# Patient Record
Sex: FEMALE | Race: WHITE | NOT HISPANIC OR LATINO | Employment: FULL TIME | URBAN - METROPOLITAN AREA
[De-identification: names, ages, dates, MRNs, and addresses within clinical notes are randomized per-mention and may not be internally consistent; named-entity substitution may affect disease eponyms.]

---

## 2017-02-14 ENCOUNTER — HISTORICAL (OUTPATIENT)
Dept: LAB | Facility: HOSPITAL | Age: 27
End: 2017-02-14

## 2017-02-16 ENCOUNTER — HISTORICAL (OUTPATIENT)
Dept: LAB | Facility: HOSPITAL | Age: 27
End: 2017-02-16

## 2017-03-01 ENCOUNTER — HISTORICAL (OUTPATIENT)
Dept: RADIOLOGY | Facility: HOSPITAL | Age: 27
End: 2017-03-01

## 2017-04-05 ENCOUNTER — HISTORICAL (OUTPATIENT)
Dept: LAB | Facility: HOSPITAL | Age: 27
End: 2017-04-05

## 2017-05-09 ENCOUNTER — HISTORICAL (OUTPATIENT)
Dept: LAB | Facility: HOSPITAL | Age: 27
End: 2017-05-09

## 2017-05-09 LAB — TSH SERPL-ACNC: 1.7 MIU/ML (ref 0.36–3.74)

## 2017-11-28 LAB — POC BETA-HCG (QUAL): NEGATIVE

## 2019-02-11 LAB — POC BETA-HCG (QUAL): NEGATIVE

## 2019-07-09 LAB
B-HCG FREE SERPL-ACNC: <1 MIU/ML
BUN SERPL-MCNC: 12 MG/DL (ref 7–18)
CALCIUM SERPL-MCNC: 9.1 MG/DL (ref 8.5–10.1)
CHLORIDE SERPL-SCNC: 104 MMOL/L (ref 98–107)
CO2 SERPL-SCNC: 27 MMOL/L (ref 21–32)
CREAT SERPL-MCNC: 0.82 MG/DL (ref 0.55–1.02)
CREAT/UREA NIT SERPL: 14.6
ERYTHROCYTE [DISTWIDTH] IN BLOOD BY AUTOMATED COUNT: 11.9 % (ref 11.5–17)
GLUCOSE SERPL-MCNC: 90 MG/DL (ref 74–106)
GROUP & RH: NORMAL
HCT VFR BLD AUTO: 39.9 % (ref 37–47)
HGB BLD-MCNC: 13.4 GM/DL (ref 12–16)
MCH RBC QN AUTO: 29.1 PG (ref 27–31)
MCHC RBC AUTO-ENTMCNC: 33.6 GM/DL (ref 33–36)
MCV RBC AUTO: 86.6 FL (ref 80–94)
PLATELET # BLD AUTO: 219 X10(3)/MCL (ref 130–400)
PMV BLD AUTO: 9.3 FL (ref 9.4–12.4)
POTASSIUM SERPL-SCNC: 4.3 MMOL/L (ref 3.5–5.1)
RBC # BLD AUTO: 4.61 X10(6)/MCL (ref 4.2–5.4)
SODIUM SERPL-SCNC: 137 MMOL/L (ref 136–145)
WBC # SPEC AUTO: 6.2 X10(3)/MCL (ref 4.5–11.5)

## 2019-07-11 ENCOUNTER — HISTORICAL (OUTPATIENT)
Dept: ADMINISTRATIVE | Facility: HOSPITAL | Age: 29
End: 2019-07-11

## 2019-07-11 LAB — B-HCG FREE SERPL-ACNC: <1 MIU/ML

## 2019-09-03 ENCOUNTER — HISTORICAL (OUTPATIENT)
Dept: ADMINISTRATIVE | Facility: HOSPITAL | Age: 29
End: 2019-09-03

## 2019-09-03 LAB
B-HCG FREE SERPL-ACNC: 300 MIU/ML
PROGEST SERPL-MCNC: 25.12 NG/ML

## 2019-09-05 ENCOUNTER — HISTORICAL (OUTPATIENT)
Dept: ADMINISTRATIVE | Facility: HOSPITAL | Age: 29
End: 2019-09-05

## 2019-09-05 LAB
B-HCG FREE SERPL-ACNC: 882 MIU/ML
PROGEST SERPL-MCNC: 17.93 NG/ML

## 2019-09-09 ENCOUNTER — HISTORICAL (OUTPATIENT)
Dept: LAB | Facility: HOSPITAL | Age: 29
End: 2019-09-09

## 2019-09-09 LAB — B-HCG FREE SERPL-ACNC: 4861 MIU/ML

## 2019-10-08 ENCOUNTER — HISTORICAL (OUTPATIENT)
Dept: ADMINISTRATIVE | Facility: HOSPITAL | Age: 29
End: 2019-10-08

## 2019-10-08 LAB
ABS NEUT (OLG): 8.05 X10(3)/MCL (ref 2.1–9.2)
BASOPHILS # BLD AUTO: 0 X10(3)/MCL (ref 0–0.2)
BASOPHILS NFR BLD AUTO: 0 %
EOSINOPHIL # BLD AUTO: 0 X10(3)/MCL (ref 0–0.9)
EOSINOPHIL NFR BLD AUTO: 0 %
ERYTHROCYTE [DISTWIDTH] IN BLOOD BY AUTOMATED COUNT: 11.8 % (ref 11.5–17)
GROUP & RH: NORMAL
HBV SURFACE AG SERPL QL IA: NEGATIVE
HCT VFR BLD AUTO: 38.6 % (ref 37–47)
HCV AB SERPL QL IA: NEGATIVE
HGB BLD-MCNC: 12.9 GM/DL (ref 12–16)
HIV 1+2 AB+HIV1 P24 AG SERPL QL IA: NEGATIVE
LYMPHOCYTES # BLD AUTO: 1.1 X10(3)/MCL (ref 0.6–4.6)
LYMPHOCYTES NFR BLD AUTO: 11 %
MCH RBC QN AUTO: 29.1 PG (ref 27–31)
MCHC RBC AUTO-ENTMCNC: 33.4 GM/DL (ref 33–36)
MCV RBC AUTO: 86.9 FL (ref 80–94)
MONOCYTES # BLD AUTO: 0.7 X10(3)/MCL (ref 0.1–1.3)
MONOCYTES NFR BLD AUTO: 7 %
NEUTROPHILS # BLD AUTO: 8.05 X10(3)/MCL (ref 2.1–9.2)
NEUTROPHILS NFR BLD AUTO: 81 %
PLATELET # BLD AUTO: 231 X10(3)/MCL (ref 130–400)
PMV BLD AUTO: 9 FL (ref 9.4–12.4)
PROGEST SERPL-MCNC: 25.68 NG/ML
RBC # BLD AUTO: 4.44 X10(6)/MCL (ref 4.2–5.4)
T PALLIDUM AB SER QL: NORMAL
TSH SERPL-ACNC: 1.48 MIU/L (ref 0.36–3.74)
WBC # SPEC AUTO: 10 X10(3)/MCL (ref 4.5–11.5)

## 2019-10-31 ENCOUNTER — HISTORICAL (OUTPATIENT)
Dept: ADMINISTRATIVE | Facility: HOSPITAL | Age: 29
End: 2019-10-31

## 2019-10-31 LAB — PROGEST SERPL-MCNC: 32.79 NG/ML

## 2019-11-13 ENCOUNTER — HISTORICAL (OUTPATIENT)
Dept: LAB | Facility: HOSPITAL | Age: 29
End: 2019-11-13

## 2019-11-13 LAB — PROGEST SERPL-MCNC: 31.41 NG/ML

## 2019-12-19 ENCOUNTER — HISTORICAL (OUTPATIENT)
Dept: ADMINISTRATIVE | Facility: HOSPITAL | Age: 29
End: 2019-12-19

## 2019-12-19 LAB — PROGEST SERPL-MCNC: 39.58 NG/ML

## 2019-12-31 ENCOUNTER — HISTORICAL (OUTPATIENT)
Dept: LAB | Facility: HOSPITAL | Age: 29
End: 2019-12-31

## 2019-12-31 LAB — PROGEST SERPL-MCNC: 49.67 NG/ML

## 2020-01-24 ENCOUNTER — HISTORICAL (OUTPATIENT)
Dept: LAB | Facility: HOSPITAL | Age: 30
End: 2020-01-24

## 2020-01-24 LAB
ERYTHROCYTE [DISTWIDTH] IN BLOOD BY AUTOMATED COUNT: 12.8 % (ref 11.5–17)
GLUCOSE 1H P 100 G GLC PO SERPL-MCNC: 148 MG/DL (ref 100–180)
HCT VFR BLD AUTO: 35.5 % (ref 37–47)
HGB BLD-MCNC: 11.9 GM/DL (ref 12–16)
MCH RBC QN AUTO: 29.9 PG (ref 27–31)
MCHC RBC AUTO-ENTMCNC: 33.5 GM/DL (ref 33–36)
MCV RBC AUTO: 89.2 FL (ref 80–94)
PLATELET # BLD AUTO: 196 X10(3)/MCL (ref 130–400)
PMV BLD AUTO: 9.5 FL (ref 9.4–12.4)
RBC # BLD AUTO: 3.98 X10(6)/MCL (ref 4.2–5.4)
WBC # SPEC AUTO: 12.4 X10(3)/MCL (ref 4.5–11.5)

## 2020-01-29 ENCOUNTER — HISTORICAL (OUTPATIENT)
Dept: LAB | Facility: HOSPITAL | Age: 30
End: 2020-01-29

## 2020-01-29 LAB
GLUCOSE 1H P 100 G GLC PO SERPL-MCNC: 153 MG/DL (ref 100–180)
GLUCOSE BS SERPL-MCNC: 128 MG/DL (ref 70–115)
GLUCOSE BS SERPL-MCNC: 94 MG/DL (ref 70–115)
GLUCOSE P FAST SERPL-MCNC: 91 MG/DL (ref 70–115)

## 2020-04-06 ENCOUNTER — HISTORICAL (OUTPATIENT)
Dept: ADMINISTRATIVE | Facility: HOSPITAL | Age: 30
End: 2020-04-06

## 2020-04-09 LAB — FINAL CULTURE: NORMAL

## 2021-03-01 ENCOUNTER — HISTORICAL (OUTPATIENT)
Dept: LAB | Facility: HOSPITAL | Age: 31
End: 2021-03-01

## 2021-03-01 LAB — PROGEST SERPL-MCNC: 19.4 NG/ML

## 2021-03-03 ENCOUNTER — HISTORICAL (OUTPATIENT)
Dept: LAB | Facility: HOSPITAL | Age: 31
End: 2021-03-03

## 2021-03-03 LAB
B-HCG FREE SERPL-ACNC: 244.91 MIU/ML
PROGEST SERPL-MCNC: 16.1 NG/ML

## 2021-04-06 ENCOUNTER — HISTORICAL (OUTPATIENT)
Dept: LAB | Facility: HOSPITAL | Age: 31
End: 2021-04-06

## 2021-04-06 LAB
ERYTHROCYTE [DISTWIDTH] IN BLOOD BY AUTOMATED COUNT: 12.1 % (ref 11.5–17)
GROUP & RH: NORMAL
HBV SURFACE AG SERPL QL IA: NONREACTIVE
HCT VFR BLD AUTO: 39.9 % (ref 37–47)
HCV AB SERPL QL IA: NONREACTIVE
HGB BLD-MCNC: 13.4 GM/DL (ref 12–16)
HIV 1+2 AB+HIV1 P24 AG SERPL QL IA: NONREACTIVE
MCH RBC QN AUTO: 29.8 PG (ref 27–31)
MCHC RBC AUTO-ENTMCNC: 33.6 GM/DL (ref 33–36)
MCV RBC AUTO: 88.9 FL (ref 80–94)
PLATELET # BLD AUTO: 227 X10(3)/MCL (ref 130–400)
PMV BLD AUTO: 9.2 FL (ref 9.4–12.4)
PROGEST SERPL-MCNC: 19.8 NG/ML
RBC # BLD AUTO: 4.49 X10(6)/MCL (ref 4.2–5.4)
T PALLIDUM AB SER QL: NONREACTIVE
TSH SERPL-ACNC: 0.62 UIU/ML (ref 0.35–4.94)
WBC # SPEC AUTO: 8 X10(3)/MCL (ref 4.5–11.5)

## 2021-05-25 ENCOUNTER — HISTORICAL (OUTPATIENT)
Dept: LAB | Facility: HOSPITAL | Age: 31
End: 2021-05-25

## 2021-05-25 LAB — PROGEST SERPL-MCNC: 17.1 NG/ML

## 2021-07-30 ENCOUNTER — HISTORICAL (OUTPATIENT)
Dept: LAB | Facility: HOSPITAL | Age: 31
End: 2021-07-30

## 2021-07-30 LAB
ERYTHROCYTE [DISTWIDTH] IN BLOOD BY AUTOMATED COUNT: 13 % (ref 11.5–17)
GLUCOSE 1H P 100 G GLC PO SERPL-MCNC: 142 MG/DL (ref 100–180)
HCT VFR BLD AUTO: 35.8 % (ref 37–47)
HGB BLD-MCNC: 12.2 GM/DL (ref 12–16)
MCH RBC QN AUTO: 30.7 PG (ref 27–31)
MCHC RBC AUTO-ENTMCNC: 34.1 GM/DL (ref 33–36)
MCV RBC AUTO: 89.9 FL (ref 80–94)
PLATELET # BLD AUTO: 168 X10(3)/MCL (ref 130–400)
PMV BLD AUTO: 9.3 FL (ref 9.4–12.4)
RBC # BLD AUTO: 3.98 X10(6)/MCL (ref 4.2–5.4)
WBC # SPEC AUTO: 9.7 X10(3)/MCL (ref 4.5–11.5)

## 2021-08-03 ENCOUNTER — HISTORICAL (OUTPATIENT)
Dept: LAB | Facility: HOSPITAL | Age: 31
End: 2021-08-03

## 2021-08-03 LAB
GLUCOSE 1H P 100 G GLC PO SERPL-MCNC: 196 MG/DL (ref 100–180)
GLUCOSE BS SERPL-MCNC: 110 MG/DL (ref 70–115)
GLUCOSE BS SERPL-MCNC: 132 MG/DL (ref 70–115)
GLUCOSE P FAST SERPL-MCNC: 96 MG/DL (ref 70–115)

## 2022-04-10 ENCOUNTER — HISTORICAL (OUTPATIENT)
Dept: ADMINISTRATIVE | Facility: HOSPITAL | Age: 32
End: 2022-04-10

## 2022-04-24 VITALS
WEIGHT: 220.69 LBS | SYSTOLIC BLOOD PRESSURE: 145 MMHG | BODY MASS INDEX: 35.47 KG/M2 | DIASTOLIC BLOOD PRESSURE: 91 MMHG | HEIGHT: 66 IN

## 2022-04-30 NOTE — OP NOTE
Patient:   Danisha Rodriguez            MRN: [id number]            FIN: [id number]               Age:   28 years     Sex:  Female     :  1990   Associated Diagnoses:   None   Author:   James Olvera MD      Operative Note   Preoperative diagnosis:    1. pelvic pain  2. suspected endometriosis  3. abnormal uterine bleeding      Postoperative diagnosis:      1.  stage I endometriosis  2. pelvic adhesive disease (altered surgical field)  3. abnormal uterine bleeding    Surgeon:   Dr. Olvera    Operations:    1. robot-assisted laparoscopic lysis of adhesions  2. robot assisted excision of endometriosis  3. diagnostic hysteroscopy with dilation and curettage    Anesthesia: General endotracheal anesthesia    Findings:  Diagnostic laparoscopy significant adhesions involving the sigmoid colon and rectum to left sidewall .  Additionally there were  endometriosis lesions involving the both ovaries, left ovarian fossae, the left Fallopian tube, and  the posterior cul-de-sac. While there was a para-tubal cyst and endometriotic mass on the left tube, the  tubes were otherwise normal in appearance with no evidence of fimbrial adhesions or occlusion        The diagnostic hysteroscope revealed a normal uterine cavity and tubal ostia.  No polyps or submucosal fibroids were seen.    Specimens:  1. posterior cul-de-sac  2. left ovarian fossa  3. left tubal mass  4. endometrial curettings    Procedure:    After informed consent and negative hCG was verified patient was taken to the operating room with IV fluid running. She was then administered general endotracheal anesthesia, and prepped and draped in low lithotomy position using stirrups. The patient's arms were tucked at her sides. A Montiel catheter was placed into the bladder. The 6cm ASHLEY tip was placed on the handle which was then placed into the vaginal cavity. Attention was then turned towards the abdomen where the base of the umbilicus was anesthetized with  half percent Marcaine with epinephrine.   A 8 mm vertical skin incision was made with scalpel at the base of the umbilicus.  A Varess needle and placed in the intraperitoneal cavity. Intraperitoneal placement confirmed by instillation of normal saline and ball test.  The peritoneum was then obtained with CO2 opening pressure that was noted to be 7 mmHg. Pressure was initially set at 20 mm of mercury until all 3 trochars were placed. After placement of all trochars, the pressure was reduced to 12mmHg for the remainder of the case.  A robotic trocar was placed in the right lower quadrant through an anesthetized 8 mm skin incision at a point approximately  8 cm lateral in a 10° caudad direction from the umbilical incision.  An additional accessory trocar was then placed in the left lower quadrant immediately symmetrical to the right lower quadrant port also an anesthetized 8 mm skin incision under laparoscopic visualization.    At this point the patient was placed in steep Trendelenburg. The bowel was swept out of the posterior cul-de-sac to the degree possible with adhesions present. Next Trendelenburg was reduced to approximately 20°. At this time the robot was docked in the following fashion. The 8.5 mm camera was docked at the umbilical port. The right lower quadrant port was assembled with the monopolar scissors using the arm #1. The left lower quadrant port degrees was assembled with the  bipolar forceps using arm #3. At this point the surgeon broke scrub and was seated at the console. Diagnostic laparoscopy was then performed with findings as described above.    First, lysis of adhesions was performed using monopolar scissors.  There was significant work required to free these adhesions, especially those involving the left uterosacral ligmaent and the sigmoid colon.  Next the all of the suspected areas of endometriosis were excised with a combination of both sharp cutting and electocautery with cut current. Care  was taken to observe the course of both ureters prior and during peritoneal resections.     Additionally, a few areas of superficial endometriosis involving the surface of the each ovary were ablated with electrocautery.      At this point the robot was undocked and the surgeon re-scrubbed.    Shayla was applied further ensure hemostasis and then Seprafilm solution was applied through the laparascope. After hemostasis was confirmed the pelvis was irrigated with warm lactated Ringer's solution.   The patient was taken out of Trendelenburg. Pelvis was again inspected and noted to be hemostatic. Approximately 700mL of warm lactated Ringer's was left in the pelvic cavity the close of the case to both minimize postoperative adhesions and pain. Pneumoperitoneum was then evacuated. All trochars were removed from the abdomen. The skin incisions for all 3 trocar sites were then closed with 4-0 Monocryl in subcuticular fashion. The skin incisions were then sealed with Dermabond skin adhesive. The ASHLEY was then removed from the vagina, and the Montiel catheter catheter was left in place of the case.     The operative hysteroscope was introducted into the uterine cavity using Normal Saline as distension media.  Next, a sharp curettage was performed without difficulty.    Instrument sponge and needle counts were correct x2. The patient was extubated and returned to the recovery room awake and in stable condition.    Complications: None    EBL: 40 ml  UOP: 200ml  IV: 1200ml    Fluid Def: 245ml of NS

## 2022-09-22 ENCOUNTER — HISTORICAL (OUTPATIENT)
Dept: ADMINISTRATIVE | Facility: HOSPITAL | Age: 32
End: 2022-09-22

## 2023-07-20 ENCOUNTER — HOSPITAL ENCOUNTER (OUTPATIENT)
Dept: RADIOLOGY | Facility: HOSPITAL | Age: 33
Discharge: HOME | End: 2023-07-20
Attending: NURSE PRACTITIONER
Payer: MEDICAID

## 2023-07-20 DIAGNOSIS — M54.50 LOW BACK PAIN: ICD-10-CM

## 2023-07-20 DIAGNOSIS — M54.50 LOW BACK PAIN: Primary | ICD-10-CM

## 2023-07-20 PROCEDURE — 72100 X-RAY EXAM L-S SPINE 2/3 VWS: CPT | Mod: TC

## 2023-08-02 DIAGNOSIS — M54.9 BACK PAIN: Primary | ICD-10-CM

## 2023-08-15 ENCOUNTER — CLINICAL SUPPORT (OUTPATIENT)
Dept: REHABILITATION | Facility: HOSPITAL | Age: 33
End: 2023-08-15
Payer: MEDICAID

## 2023-08-15 DIAGNOSIS — M53.3 SI (SACROILIAC) JOINT DYSFUNCTION: ICD-10-CM

## 2023-08-15 DIAGNOSIS — M62.9 HAMSTRING TIGHTNESS OF BOTH LOWER EXTREMITIES: ICD-10-CM

## 2023-08-15 DIAGNOSIS — M54.9 BACK PAIN WITHOUT RADIATION: Primary | ICD-10-CM

## 2023-08-15 DIAGNOSIS — M62.81 MUSCLE WEAKNESS OF LOWER EXTREMITY: ICD-10-CM

## 2023-08-15 PROCEDURE — 97163 PT EVAL HIGH COMPLEX 45 MIN: CPT

## 2023-08-15 NOTE — PLAN OF CARE
[facility]   Physical Therapy Initial Evaluation      Name: Danisha Rodriguez  Clinic Number: [id number]    Therapy Diagnosis:   Encounter Diagnoses   Name Primary?    Back pain without radiation Yes    Muscle weakness of lower extremity     SI (sacroiliac) joint dysfunction     Hamstring tightness of both lower extremities         Physician: Obdulia Bah, NP    Physician Orders: PT Eval and Treat   Medical Diagnosis from Referral: M54.9 back pain without radiation  Evaluation Date: 8/15/2023  Authorization Period Expiration: TBD  Plan of Care Expiration: 9/15/2023  Progress Note Due: 9/11/2023  Visit # / Visits authorized: TBD  FOTO: 56    Precautions: Standard     Time In: 0900   Time Out: 1000  Total Billable Time: 60 minutes    Subjective     Date of onset: approximately 7 years ago    History of current condition - Danisha reports: she initially strained her back when she was helping to pull a boat on shore.  She pulled her back and was unable to move.  It improved over time some but she has had pain since.  Since the initial injury she has had 2 children.  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    Falls: none    Imaging: x-ray: grade 1 anterolisthesis of L5 on S1 with associated loss of disc height and a likely pars defect.  No acute fracture identified.    Prior Therapy: none  Social History: patient lives with their family  Occupation: crawfish plant  Prior Level of Function: independent   Current Level of Function: modified independent    Pain:  Current 7/10, worst 9/10, best 6/10   Location: bilateral back  and feet  back - lumbar and bilateral foot/feet   Description: intermittent   Aggravating Factors: Standing and Bending  Easing Factors: rest    Patients goals: relieve pain and be able to bend down easily for my children     Medical History:   Past Medical History:   Diagnosis Date    Abnormal uterine bleeding (AUB)     Anxiety disorder,  unspecified     Deep dyspareunia in female     Dysmenorrhea, unspecified     Endometriosis, unspecified     Gestational hypertension     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    Medications:   Danisha currently has no medications in their medication list.    Allergies:   Review of patient's allergies indicates:  No Known Allergies     Objective      AROM: LUMBAR COMMENT   Flexion 75% Pain on bending   Extension WNL Pain at end range   Right side bending WNL Pulls on left   Left side bending WNL Pulls on right   Right rotation WNL No pain   Left rotation WNL No pain       L/E MMT Right Left   Hip Flexion 5/5 4+/5   Hip Extension 4-/5 3+/5   Hip Abduction 4/5 4/5   Knee Flexion 4/5 4-/5   Knee Extension 5/5 5/5   Ankle DF 5/5 5/5      Posture: L posterior innominate noted which responded to muscle energy exercise to correct    Gait: L foot pronation during stance; minimal to no arm swing.  Gait speed=1.45 meters/second  Balance: 5xSTS = 16.33 sec       Single leg stance: eyes open: R / L 30 seconds each; eyes closed: R 3 seconds, L 7 seconds       MCTSIB= 2/4 (30, 30, 3, 1)      Intake Outcome Measure for FOTO back Survey    Therapist reviewed FOTO scores for Danisha Rodriguez on 8/15/2023.   FOTO report - see Media section or FOTO account episode details.    Intake Score: 56%         Treatment     Total Treatment time (time-based codes) separate from Evaluation: 20 minutes     Danisha received the treatments listed below:      therapeutic exercises to develop strength, flexibility, and core stabilization for 20 minutes including:  Therapeutic Exercise Grid     Exercise 1  Exercise 2  Exercise 3  Exercise 4    Exercise :    Muscle energy exercise bridging   LTR    Sit to stands     Repetition/Time :    3   5   5   5     Resist or Assist :                  Comment :                Done :    yes  yes   yes   yes                    Exercise 5  Exercise 6  Exercise 7  Exercise 8    Exercise :    Bilateral 3 way hip   Bilateral clamshells   Leg lengthener   QL stretch; PF stretch     Repetition/Time :    5 each   5 each   5   Attempted but caused pain     Resist or Assist :                  Comment :                  Done :    yes   yes   yes   no                     Exercise 9  Exercise 10  Exercise 11  Exercise 12    Exercise :    Step ups forward/lateral   Sidestepping with TB Nu-step      Repetition/Time :                  Resist or Assist :                  Comment :                  Done :                                 Exercise 13 Exercise 14  Exercise 15  Exercise 16   Exercise :                  Repetition/Time :                  Resist or Assist :                  Comment :                  Done :                                  Exercise 17 Exercise 18 Exercise 19 Exercise 20    Exercise :                  Repetition/Time :                  Resist or Assist :                  Comment :                  Done :                                   Patient Education and Home Exercises     Education provided:   - Patient was instructed in a home exercise program as well as educated in spinal / pelvic anatomy and rationale for stabilization exercises.  She was also educated about supportive shoes for work.    Written Home Exercises Provided: yes. Exercises were reviewed and Danisha was able to demonstrate them prior to the end of the session.  Danisha demonstrated good  understanding of the education provided. See EMR under Patient Instructions for exercises provided during therapy sessions.    Assessment     Danisha is a 32 y.o. female referred to outpatient Physical Therapy with a medical diagnosis of back pain. Patient presents with SI dysfunction and core weakness which is  contributing to her back pain.  She will benefit from PT to build strength and flexibility to reduce pain.    Patient prognosis is Excellent.   Patient will benefit from skilled outpatient Physical Therapy to address the deficits stated above and in the chart below, provide patient /family education, and to maximize patientt's level of independence.     Plan of care discussed with patient: Yes  Patient's spiritual, cultural and educational needs considered and patient is agreeable to the plan of care and goals as stated below:     Anticipated Barriers for therapy: back pain, SI dysfunction, muscle weakness    Medical Necessity is demonstrated by the following  History  Co-morbidities and personal factors that may impact the plan of care [] LOW: no personal factors / co-morbidities  [] MODERATE: 1-2 personal factors / co-morbidities  [x] HIGH: 3+ personal factors / co-morbidities    Moderate / High Support Documentation:   Co-morbidities affecting plan of care: see above list    Personal Factors:   no deficits     Examination  Body Structures and Functions, activity limitations and participation restrictions that may impact the plan of care [] LOW: addressing 1-2 elements  [] MODERATE: 3+ elements  [x] HIGH: 4+ elements (please support below)    Moderate / High Support Documentation: SI dysfunction, pain, decreased muscle strength, decreased flexibility, impaired balance      Clinical Presentation [x] LOW: stable  [] MODERATE: Evolving  [] HIGH: Unstable     Decision Making/ Complexity Score: high       Goals:  Short Term Goals (3 Weeks):  1. Patient will be compliant with home exercise program to supplement therapy in promoting functional mobility.  2. Patient will report pain </= 4/10 during lumbar active range of motion to promote functional mobility.  3. Patient will improve bilateral hip girdle strength to >/= 4/5 for all muscle groups to improve strength for functional tasks.  4. 5x sit to stand will be 12 sec  or less using UEs to assist as needed.    Long Term Goals (6 Weeks):   1. Patient will improve FOTO score to >/= 66%  to decrease perceived limitation with maintaining/changing body position.   2. Patient will perform home exercise program with good control to demonstrate improved core strength.  3. Patient will improve impaired lower extremity manual muscle tests to >/= 4+/5 to improve strength for functional tasks.  4. Patient's pain complaints with be </= 2/10 during mobility.  5. MCTSIB will improve to 3/4 for safer community mobility.  6. Single leg stance will improve to >/= 15 sec bilaterally with eyes closed for reduced back pain during upright functioning.       Plan     Plan of care Certification: 8/15/2023 to 9/15/2023.    Outpatient Physical Therapy 2 times weekly for 4 weeks to include the following interventions: Manual Therapy, Neuromuscular Re-ed, Patient Education, and Therapeutic Exercise.     Shahida Lauren PT        Physician's Signature: _________________________________________ Date: ________________

## 2023-08-18 DIAGNOSIS — M54.9 BACK PAIN WITHOUT RADIATION: Primary | ICD-10-CM

## 2023-08-24 ENCOUNTER — CLINICAL SUPPORT (OUTPATIENT)
Dept: REHABILITATION | Facility: HOSPITAL | Age: 33
End: 2023-08-24
Payer: MEDICAID

## 2023-08-24 DIAGNOSIS — M53.3 SI (SACROILIAC) JOINT DYSFUNCTION: ICD-10-CM

## 2023-08-24 DIAGNOSIS — M62.81 MUSCLE WEAKNESS OF LOWER EXTREMITY: ICD-10-CM

## 2023-08-24 DIAGNOSIS — M54.9 BACK PAIN WITHOUT RADIATION: Primary | ICD-10-CM

## 2023-08-24 DIAGNOSIS — M62.9 HAMSTRING TIGHTNESS OF BOTH LOWER EXTREMITIES: ICD-10-CM

## 2023-08-24 PROCEDURE — 97110 THERAPEUTIC EXERCISES: CPT

## 2023-08-24 NOTE — PROGRESS NOTES
[facility]   Physical Therapy Treatment Note      Name: Danisha Rodriguez  Clinic Number: [id number]    Therapy Diagnosis:   Encounter Diagnoses   Name Primary?    Back pain without radiation Yes    Muscle weakness of lower extremity     SI (sacroiliac) joint dysfunction     Hamstring tightness of both lower extremities      Physician: Obdulia Bah, NP     Physician Orders: PT Eval and Treat   Medical Diagnosis from Referral: M54.9 back pain without radiation  Evaluation Date: 8/15/2023  Authorization Period Expiration: 9/19/2023  Plan of Care Expiration: 9/19/2023  Progress Note Due: 9/11/2023  Visit # / Visits authorized: 1/10  FOTO: 1/3     Precautions: Standard      Time In: 0830  Time Out: 958  Total Billable Time: 28 minutes  Visit Date: 8/24/2023    PTA Visit #: 0/5     Subjective     Pt reports: she continues to have pain but it's on the right side now.  She was compliant with home exercise program.  Response to previous treatment: soreness  Functional change: 5xSTS time decreased    Pain: 6/10  Location: right buttocks      Objective      R anterior innominate noted today.  Patient responded to MET to resist R hip flexion.  Pelvic levels level after MET and remained level at end of session.    5xSTS = 11.90 sec without using UE to assist    Treatment     Danisha received the treatments listed below:      therapeutic exercises to develop strength, flexibility, and core stabilization for 20 minutes including:  Therapeutic Exercise Grid     Exercise 1  Exercise 2  Exercise 3  Exercise 4    Exercise :    Muscle energy exercise Bridging: ball b/t knees and TB around knees  LTR   Sit to stands     Repetition/Time :    3   10 each    2 x 5     Resist or Assist :        green TB          Comment :    R posterior rotation today      stopped  Cues for improved forward transition     Done :    yes  yes   no  yes                      Exercise 5  Exercise 6  Exercise 7  Exercise 8     Exercise :    Bilateral 3 way hip   Bilateral clamshells; reverse clamshells   Leg lengthener   QL stretch; PF stretch     Repetition/Time :    10 each   10 each   5   Attempted but caused pain     Resist or Assist :                  Comment :    No hip extension              Done :    yes   yes   no   no                      Exercise 9  Exercise 10  Exercise 11  Exercise 12    Exercise :    Step ups forward/lateral   Sidestepping with TB Nu-step SL hip mobility B     Repetition/Time :    10 each bilaterally   4 x 10' each      10 each     Resist or Assist :       GTB           Comment :       Around foot, ankles, knees           Done :    yes   yes      yes                      Exercise 13 Exercise 14  Exercise 15  Exercise 16   Exercise :                  Repetition/Time :                  Resist or Assist :                  Comment :                  Done :                                   Exercise 17 Exercise 18 Exercise 19 Exercise 20    Exercise :                  Repetition/Time :                  Resist or Assist :                  Comment :                  Done :                                 Patient Education and Home Exercises       Education provided:    - Patient was instructed in a home exercise program as well as educated in spinal / pelvic anatomy and rationale for stabilization exercises.  She was also educated about supportive shoes for work.     Written Home Exercises Provided: Patient instructed to cont prior HEP. Exercises were reviewed and Danisha was able to demonstrate them prior to the end of the session.  Danisha demonstrated good  understanding of the education provided. See EMR under Patient Instructions for exercises provided during therapy sessions    Assessment     Insurance approved 10 visits.  Patient voiced soreness after PT program but no increase in pain.  She needed moderate verbal and tactile cues to correct positioning during exercises.    Danisha Is progressing well towards  her goals.   Pt prognosis is Excellent.     Pt will continue to benefit from skilled outpatient physical therapy to address the deficits listed in the problem list box on initial evaluation, provide pt/family education and to maximize pt's level of independence in the home and community environment.     Pt's spiritual, cultural and educational needs considered and pt agreeable to plan of care and goals.     Anticipated barriers to physical therapy:  back pain, SI dysfunction, muscle weakness     Goals: Short Term Goals (3 Weeks):  1. Patient will be compliant with home exercise program to supplement therapy in promoting functional mobility.  2. Patient will report pain </= 4/10 during lumbar active range of motion to promote functional mobility.  3. Patient will improve bilateral hip girdle strength to >/= 4/5 for all muscle groups to improve strength for functional tasks.  4. 5x sit to stand will be 12 sec or less using UEs to assist as needed.     Long Term Goals (6 Weeks):   1. Patient will improve FOTO score to >/= 66%  to decrease perceived limitation with maintaining/changing body position.   2. Patient will perform home exercise program with good control to demonstrate improved core strength.  3. Patient will improve impaired lower extremity manual muscle tests to >/= 4+/5 to improve strength for functional tasks.  4. Patient's pain complaints with be </= 2/10 during mobility.  5. MCTSIB will improve to 3/4 for safer community mobility.  6. Single leg stance will improve to >/= 15 sec bilaterally with eyes closed for reduced back pain during upright functioning.     Plan     Plan of care Certification: 8/15/2023 to 9/19/2023.     Outpatient Physical Therapy 2 times weekly for 4 weeks to include the following interventions: Manual Therapy, Neuromuscular Re-ed, Patient Education, and Therapeutic Exercise.     Shahida Lauren, PT

## 2023-08-29 ENCOUNTER — CLINICAL SUPPORT (OUTPATIENT)
Dept: REHABILITATION | Facility: HOSPITAL | Age: 33
End: 2023-08-29
Payer: MEDICAID

## 2023-08-29 DIAGNOSIS — M62.9 HAMSTRING TIGHTNESS OF BOTH LOWER EXTREMITIES: ICD-10-CM

## 2023-08-29 DIAGNOSIS — M62.81 MUSCLE WEAKNESS OF LOWER EXTREMITY: ICD-10-CM

## 2023-08-29 DIAGNOSIS — M54.9 BACK PAIN WITHOUT RADIATION: Primary | ICD-10-CM

## 2023-08-29 DIAGNOSIS — M53.3 SI (SACROILIAC) JOINT DYSFUNCTION: ICD-10-CM

## 2023-08-29 PROCEDURE — 97110 THERAPEUTIC EXERCISES: CPT

## 2023-08-29 NOTE — PROGRESS NOTES
[facility]   Physical Therapy Treatment Note      Name: Danisha Rodriguez  Clinic Number: [id number]    Therapy Diagnosis:   Encounter Diagnoses   Name Primary?    Back pain without radiation Yes    Muscle weakness of lower extremity     SI (sacroiliac) joint dysfunction     Hamstring tightness of both lower extremities      Physician: Obdulia Bah, NP     Physician Orders: PT Eval and Treat   Medical Diagnosis from Referral: M54.9 back pain without radiation  Evaluation Date: 8/15/2023  Authorization Period Expiration: 9/19/2023  Plan of Care Expiration: 9/19/2023  Progress Note Due: 9/11/2023  Visit # / Visits authorized: 2/10  FOTO: 1/3 (56%)     Precautions: Standard      Time In: 0830  Time Out: 905  Total Billable Time: 35 minutes  Visit Date: 8/29/2023    PTA Visit #: 0/5     Subjective     Pt reports: she has centralized lumbo sacral pain today.  She has not been working as [employer] season is over.  She was compliant with home exercise program.  Response to previous treatment: soreness  Functional change: 5xSTS time decreased    Pain: 7/10  Location: L-S junction     Objective      None today    Treatment     Danisha received the treatments listed below:      therapeutic exercises to develop strength, flexibility, and core stabilization for 20 minutes including:  Therapeutic Exercise Grid     Exercise 1  Exercise 2  Exercise 3  Exercise 4    Exercise :    Muscle energy exercise Bridging: ball b/t knees and TB around knees  Prone press-ups attempted  Sit to stands     Repetition/Time :    3   2x 10 each  10  2 x 10    Resist or Assist :        green TB     3#     Comment :    Level today      Will not continue due to increased pain      Done :    no yes   yes  yes                      Exercise 5  Exercise 6  Exercise 7  Exercise 8    Exercise :    Bilateral 3 way hip   Bilateral clamshells; reverse clamshells   Leg lengthener   QL stretch; PF stretch     Repetition/Time :    2  x10 each   2 x 10 each   5   Attempted but caused pain     Resist or Assist :    1# Bilaterally   GTB; 1#           Comment :    No hip extension              Done :    yes   yes   no   no                      Exercise 9  Exercise 10  Exercise 11  Exercise 12    Exercise :    Step ups forward/lateral   Sidestepping with TB Nu-step SL hip mobility B     Repetition/Time :    10 each bilaterally   4 x 10' each   5 minimal LE only   10 each     Resist or Assist :       GTB   Level 3        Comment :       Around foot, ankles, knees           Done :    no no yes no                    Exercise 13 Exercise 14  Exercise 15  Exercise 16   Exercise :                  Repetition/Time :                  Resist or Assist :                  Comment :                  Done :                                   Exercise 17 Exercise 18 Exercise 19 Exercise 20    Exercise :                  Repetition/Time :                  Resist or Assist :                  Comment :                  Done :                                 Patient Education and Home Exercises       Education provided:    - Patient was instructed in a home exercise program as well as educated in spinal / pelvic anatomy and rationale for stabilization exercises.  She was also educated about supportive shoes for work.     Written Home Exercises Provided: Patient instructed to cont prior HEP. Exercises were reviewed and Danisha was able to demonstrate them prior to the end of the session.  Danisha demonstrated good  understanding of the education provided. See EMR under Patient Instructions for exercises provided during therapy sessions    Assessment     Patient was able to tolerate 2 x 10 reps of several exercises using 1# ankle weights or green TB.  She c/o leg soreness / fatigue after exercises.      Danisha Is progressing well towards her goals.   Pt prognosis is Excellent.     Pt will continue to benefit from skilled outpatient physical therapy to address the deficits  listed in the problem list box on initial evaluation, provide pt/family education and to maximize pt's level of independence in the home and community environment.     Pt's spiritual, cultural and educational needs considered and pt agreeable to plan of care and goals.     Anticipated barriers to physical therapy:  back pain, SI dysfunction, muscle weakness     Goals: Short Term Goals (3 Weeks):  1. Patient will be compliant with home exercise program to supplement therapy in promoting functional mobility.  2. Patient will report pain </= 4/10 during lumbar active range of motion to promote functional mobility.  3. Patient will improve bilateral hip girdle strength to >/= 4/5 for all muscle groups to improve strength for functional tasks.  4. 5x sit to stand will be 12 sec or less using UEs to assist as needed.     Long Term Goals (6 Weeks):   1. Patient will improve FOTO score to >/= 66%  to decrease perceived limitation with maintaining/changing body position.   2. Patient will perform home exercise program with good control to demonstrate improved core strength.  3. Patient will improve impaired lower extremity manual muscle tests to >/= 4+/5 to improve strength for functional tasks.  4. Patient's pain complaints with be </= 2/10 during mobility.  5. MCTSIB will improve to 3/4 for safer community mobility.  6. Single leg stance will improve to >/= 15 sec bilaterally with eyes closed for reduced back pain during upright functioning.     Plan     Plan of care Certification: 8/15/2023 to 9/19/2023.     Outpatient Physical Therapy 2 times weekly for 4 weeks to include the following interventions: Manual Therapy, Neuromuscular Re-ed, Patient Education, and Therapeutic Exercise.     Shahida Lauren, PT

## 2023-08-31 ENCOUNTER — CLINICAL SUPPORT (OUTPATIENT)
Dept: REHABILITATION | Facility: HOSPITAL | Age: 33
End: 2023-08-31
Payer: MEDICAID

## 2023-08-31 DIAGNOSIS — M53.3 SI (SACROILIAC) JOINT DYSFUNCTION: ICD-10-CM

## 2023-08-31 DIAGNOSIS — M54.9 BACK PAIN WITHOUT RADIATION: Primary | ICD-10-CM

## 2023-08-31 DIAGNOSIS — M62.9 HAMSTRING TIGHTNESS OF BOTH LOWER EXTREMITIES: ICD-10-CM

## 2023-08-31 DIAGNOSIS — M62.81 MUSCLE WEAKNESS OF LOWER EXTREMITY: ICD-10-CM

## 2023-08-31 PROCEDURE — 97110 THERAPEUTIC EXERCISES: CPT

## 2023-08-31 NOTE — PROGRESS NOTES
[facility]   Physical Therapy Treatment Note      Name: Danisha Rodriguez  Clinic Number: [id number]    Therapy Diagnosis:   Encounter Diagnoses   Name Primary?    Back pain without radiation Yes    Muscle weakness of lower extremity     SI (sacroiliac) joint dysfunction     Hamstring tightness of both lower extremities      Physician: Obdulia Bah, NP     Physician Orders: PT Eval and Treat   Medical Diagnosis from Referral: M54.9 back pain without radiation  Evaluation Date: 8/15/2023  Authorization Period Expiration: 9/19/2023  Plan of Care Expiration: 9/19/2023  Progress Note Due: 9/11/2023  Visit # / Visits authorized: 3/10  FOTO: 1/3 (56%)     Precautions: Standard      Time In: 0830  Time Out: 900  Total Billable Time: 30 minutes  Visit Date: 8/31/2023    PTA Visit #: 0/5     Subjective     Pt reports: she was sore for that day following last PT visit.  Pain is less today  She was compliant with home exercise program.  Response to previous treatment: soreness  Functional change: improving mobility    Pain: 5/10  Location: L-S junction     Objective      None today    Treatment     Danisha received the treatments listed below:      therapeutic exercises to develop strength, flexibility, and core stabilization for 20 minutes including:  Therapeutic Exercise Grid     Exercise 1  Exercise 2  Exercise 3  Exercise 4    Exercise :    Muscle energy exercise Bridging: ball b/t knees and TB around knees  Elevated dead lift from mat Sit to stands     Repetition/Time :    3   2x 10 each  10 2 x 10    Resist or Assist :        green TB    5# 5#     Comment :    Level today           Done :    no yes   yes yes                      Exercise 5  Exercise 6  Exercise 7  Exercise 8    Exercise :    Bilateral 3 way hip   Bilateral clamshells; reverse clamshells   Leg lengthener   QL stretch; PF stretch     Repetition/Time :    2 x10 each   2 x 10 each   5   Attempted but caused pain     Resist or  Assist :    1.5# Bilaterally   GTB; 1.5#           Comment :    No hip extension              Done :    yes   yes   no   no                      Exercise 9  Exercise 10  Exercise 11  Exercise 12    Exercise :    Step ups forward/lateral   Sidestepping with TB Nu-step SL hip mobility B     Repetition/Time :    10 each bilaterally   4 x 10' each    7 minutes; UE/LE  10 each     Resist or Assist :       GTB   Level 4        Comment :       Around foot, ankles, knees           Done :    no no yes no                    Exercise 13 Exercise 14  Exercise 15  Exercise 16   Exercise :    Baltazar's Carry   Piloff Press         Repetition/Time :                  Resist or Assist :                  Comment :                  Done :                                   Exercise 17 Exercise 18 Exercise 19 Exercise 20    Exercise :                  Repetition/Time :                  Resist or Assist :                  Comment :                  Done :                                 Patient Education and Home Exercises       Education provided:    - Patient was instructed in a home exercise program as well as educated in spinal / pelvic anatomy and rationale for stabilization exercises.  She was also educated about supportive shoes for work.     Written Home Exercises Provided: Patient instructed to cont prior HEP. Exercises were reviewed and Danisha was able to demonstrate them prior to the end of the session.  Danisha demonstrated good  understanding of the education provided. See EMR under Patient Instructions for exercises provided during therapy sessions    Assessment     Patient was able to increase resistance of exercises.  Residual soreness after exercise but improvement noted from last session.  Minimal cues for proper technique during exercises.      Danisha Is progressing well towards her goals.   Pt prognosis is Excellent.     Pt will continue to benefit from skilled outpatient physical therapy to address the deficits listed in  the problem list box on initial evaluation, provide pt/family education and to maximize pt's level of independence in the home and community environment.     Pt's spiritual, cultural and educational needs considered and pt agreeable to plan of care and goals.     Anticipated barriers to physical therapy:  back pain, SI dysfunction, muscle weakness     Goals: Short Term Goals (3 Weeks):  1. Patient will be compliant with home exercise program to supplement therapy in promoting functional mobility.--progressing  2. Patient will report pain </= 4/10 during lumbar active range of motion to promote functional mobility.--progressing  3. Patient will improve bilateral hip girdle strength to >/= 4/5 for all muscle groups to improve strength for functional tasks.--progressing  4. 5x sit to stand will be 12 sec or less using UEs to assist as needed.--MET     Long Term Goals (6 Weeks):   1. Patient will improve FOTO score to >/= 66%  to decrease perceived limitation with maintaining/changing body position.   2. Patient will perform home exercise program with good control to demonstrate improved core strength.  3. Patient will improve impaired lower extremity manual muscle tests to >/= 4+/5 to improve strength for functional tasks.  4. Patient's pain complaints with be </= 2/10 during mobility.  5. MCTSIB will improve to 3/4 for safer community mobility.  6. Single leg stance will improve to >/= 15 sec bilaterally with eyes closed for reduced back pain during upright functioning.     Plan     Plan of care Certification: 8/15/2023 to 9/19/2023.     Outpatient Physical Therapy 2 times weekly for 4 weeks to include the following interventions: Manual Therapy, Neuromuscular Re-ed, Patient Education, and Therapeutic Exercise.     Shahida Lauren, PT

## 2023-09-05 ENCOUNTER — CLINICAL SUPPORT (OUTPATIENT)
Dept: REHABILITATION | Facility: HOSPITAL | Age: 33
End: 2023-09-05
Payer: MEDICAID

## 2023-09-05 DIAGNOSIS — M62.9 HAMSTRING TIGHTNESS OF BOTH LOWER EXTREMITIES: ICD-10-CM

## 2023-09-05 DIAGNOSIS — M62.81 MUSCLE WEAKNESS OF LOWER EXTREMITY: ICD-10-CM

## 2023-09-05 DIAGNOSIS — M53.3 SI (SACROILIAC) JOINT DYSFUNCTION: ICD-10-CM

## 2023-09-05 DIAGNOSIS — M54.9 BACK PAIN WITHOUT RADIATION: Primary | ICD-10-CM

## 2023-09-05 PROCEDURE — 97110 THERAPEUTIC EXERCISES: CPT

## 2023-09-05 NOTE — PROGRESS NOTES
[facility]   Physical Therapy Treatment Note      Name: Danisha Rodriguez  Clinic Number: [id number]    Therapy Diagnosis:   Encounter Diagnoses   Name Primary?    Back pain without radiation Yes    Muscle weakness of lower extremity     SI (sacroiliac) joint dysfunction     Hamstring tightness of both lower extremities      Physician: Obdulia Bah, NP     Physician Orders: PT Eval and Treat   Medical Diagnosis from Referral: M54.9 back pain without radiation  Evaluation Date: 8/15/2023  Authorization Period Expiration: 9/19/2023  Plan of Care Expiration: 9/19/2023  Progress Note Due: 9/11/2023  Visit # / Visits authorized: 3/10  FOTO: 2/3 (56%; 48%)     Precautions: Standard      Time In: 0830  Time Out: 900  Total Billable Time: 30 minutes  Visit Date: 9/5/2023    PTA Visit #: 0/5     Subjective     Pt reports: no pain on starting PT today but she still has pain during sit to stand, getting in/out of car.  She was compliant with home exercise program.  Response to previous treatment: soreness  Functional change: improving mobility    Pain: 5/10 at worst, no meds  Location: L-S junction     Objective      FOTO: decreased to score of 48 today.  Patient denies any reason that it could be worse.    Treatment     Danisha received the treatments listed below:      therapeutic exercises to develop strength, flexibility, and core stabilization for 30 minutes including:  Therapeutic Exercise Grid     Exercise 1  Exercise 2  Exercise 3  Exercise 4    Exercise :    Muscle energy exercise Bridging: ball b/t knees and TB around knees  Elevated dead lift from  lowest Sit to stands     Repetition/Time :    3   2x 15 each  10 2 x 10    Resist or Assist :        green TB    18# kb 8#     Comment :    Level today           Done :    no yes   yes yes                      Exercise 5  Exercise 6  Exercise 7  Exercise 8    Exercise :    Bilateral 3 way hip   Bilateral clamshells; reverse clamshells   Leg  lengthener   QL stretch; PF stretch     Repetition/Time :    2 x10 each; 1 x 10 ext  2 x 10 each   5   Attempted but caused pain     Resist or Assist :    2# Bilaterally; no wt for ext  GTB; 1.5#           Comment :    No hip extension   Performed as seated hip abduction with GTB 2 x 10 reps           Done :    yes   yes no   no                      Exercise 9  Exercise 10  Exercise 11  Exercise 12    Exercise :    Step ups forward/lateral   Sidestepping with TB Nu-step SL hip mobility B     Repetition/Time :    10 each bilaterally   4 x 10' each    8 1/2minutes; UE/LE  2 x10 each     Resist or Assist :       GTB   Level 5   2#     Comment :       Around foot, ankles, knees           Done :    no no yes yes                    Exercise 13 Exercise 14  Exercise 15  Exercise 16   Exercise :    Baltazar's Carry   Piloff Press         Repetition/Time :    1 lap each arm   10           Resist or Assist :    18#   green           Comment :                  Done :    yes   yes                            Exercise 17 Exercise 18 Exercise 19 Exercise 20    Exercise :                  Repetition/Time :                  Resist or Assist :                  Comment :                  Done :                                 Patient Education and Home Exercises       Education provided:    - Patient was instructed in a home exercise program as well as educated in spinal / pelvic anatomy and rationale for stabilization exercises.  She was also educated about supportive shoes for work.     Written Home Exercises Provided: Patient instructed to cont prior HEP. Exercises were reviewed and Danisha was able to demonstrate them prior to the end of the session.  Danisha demonstrated good  understanding of the education provided. See EMR under Patient Instructions for exercises provided during therapy sessions    Assessment     Patient tolerated increased resistance but legs felt shakey after PT.  No pain at start of session, soreness after.  FOTO  noted to decline but no apparent reason as patient states she is having pain only during certain activities now.  Hip extensor exercise initiated in prone without weight.  This was difficult for patient.    Danisha Is progressing well towards her goals.   Pt prognosis is Excellent.     Pt will continue to benefit from skilled outpatient physical therapy to address the deficits listed in the problem list box on initial evaluation, provide pt/family education and to maximize pt's level of independence in the home and community environment.     Pt's spiritual, cultural and educational needs considered and pt agreeable to plan of care and goals.     Anticipated barriers to physical therapy:  back pain, SI dysfunction, muscle weakness     Goals: Short Term Goals (3 Weeks):  1. Patient will be compliant with home exercise program to supplement therapy in promoting functional mobility.--progressing  2. Patient will report pain </= 4/10 during lumbar active range of motion to promote functional mobility.--progressing  3. Patient will improve bilateral hip girdle strength to >/= 4/5 for all muscle groups to improve strength for functional tasks.--progressing  4. 5x sit to stand will be 12 sec or less using UEs to assist as needed.--MET     Long Term Goals (6 Weeks):   1. Patient will improve FOTO score to >/= 66%  to decrease perceived limitation with maintaining/changing body position.   2. Patient will perform home exercise program with good control to demonstrate improved core strength.  3. Patient will improve impaired lower extremity manual muscle tests to >/= 4+/5 to improve strength for functional tasks.  4. Patient's pain complaints with be </= 2/10 during mobility.  5. MCTSIB will improve to 3/4 for safer community mobility.  6. Single leg stance will improve to >/= 15 sec bilaterally with eyes closed for reduced back pain during upright functioning.     Plan     Plan of care Certification: 8/15/2023 to 9/19/2023.      Outpatient Physical Therapy 2 times weekly for 4 weeks to include the following interventions: Manual Therapy, Neuromuscular Re-ed, Patient Education, and Therapeutic Exercise.     Shahida Lauren, PT

## 2023-09-08 ENCOUNTER — CLINICAL SUPPORT (OUTPATIENT)
Dept: REHABILITATION | Facility: HOSPITAL | Age: 33
End: 2023-09-08
Payer: MEDICAID

## 2023-09-08 DIAGNOSIS — M53.3 SI (SACROILIAC) JOINT DYSFUNCTION: ICD-10-CM

## 2023-09-08 DIAGNOSIS — M62.9 HAMSTRING TIGHTNESS OF BOTH LOWER EXTREMITIES: ICD-10-CM

## 2023-09-08 DIAGNOSIS — M54.9 BACK PAIN WITHOUT RADIATION: Primary | ICD-10-CM

## 2023-09-08 DIAGNOSIS — M62.81 MUSCLE WEAKNESS OF LOWER EXTREMITY: ICD-10-CM

## 2023-09-08 PROCEDURE — 97110 THERAPEUTIC EXERCISES: CPT

## 2023-09-08 NOTE — PROGRESS NOTES
[facility]   Physical Therapy Treatment Note      Name: Dansiha Rodriguez  Clinic Number: [id number]    Therapy Diagnosis:   Encounter Diagnoses   Name Primary?    Back pain without radiation Yes    Muscle weakness of lower extremity     SI (sacroiliac) joint dysfunction     Hamstring tightness of both lower extremities      Physician: Obdulia Bah, NP     Physician Orders: PT Eval and Treat   Medical Diagnosis from Referral: M54.9 back pain without radiation  Evaluation Date: 8/15/2023  Authorization Period Expiration: 9/19/2023  Plan of Care Expiration: 9/19/2023  Progress Note Due: 9/11/2023  Visit # / Visits authorized: 4/10  FOTO: 2/3 (56%; 48%)     Precautions: Standard      Time In: 0815  Time Out: 0845  Total Billable Time: 30 minutes  Visit Date: 9/8/2023    PTA Visit #: 0/5     Subjective     Pt reports: no pain on starting PT today but she was very sore in legs after last visit.  She was compliant with home exercise program.  Response to previous treatment: soreness  Functional change: improving mobility    Pain: 5/10 at worst, no meds  Location: quads    Objective      none    Treatment     Danisha received the treatments listed below:      therapeutic exercises to develop strength, flexibility, and core stabilization for 30 minutes including:  Therapeutic Exercise Grid     Exercise 1  Exercise 2  Exercise 3  Exercise 4    Exercise :    Muscle energy exercise Bridging: ball b/t knees and TB around knees  Elevated dead lift from  lowest Sit to stands     Repetition/Time :    3    15 each  10 20    Resist or Assist :        green TB    18# kb 8#     Comment :    Level today           Done :    no yes   yes yes                      Exercise 5  Exercise 6  Exercise 7  Exercise 8    Exercise :    Bilateral 3 way hip   Bilateral clamshells;   Leg lengthener   QL stretch; PF stretch     Repetition/Time :    2 x10 each; 1 x 10 ext  2 x 10 each   5   Attempted but caused pain      Resist or Assist :    2# Bilaterally; no wt for ext  GTB;         Comment :       Performed as seated hip abduction with GTB 2 x 10 reps           Done :    yes   yes no   no                      Exercise 9  Exercise 10  Exercise 11  Exercise 12    Exercise :    Step ups forward/lateral   Sidestepping with TB Nu-step SL hip mobility B     Repetition/Time :    10 each bilaterally   4 x 10' each    5 minutes; UE/LE  2 x10 each     Resist or Assist :       GTB   Level 6      Comment :       Around foot, ankles, knees           Done :    no no yes no                    Exercise 13 Exercise 14  Exercise 15  Exercise 16   Exercise :    Baltazar's Carry   Piloff Press         Repetition/Time :    2 laps each arm   10           Resist or Assist :    18#   green           Comment :                  Done :    yes   yes                            Exercise 17 Exercise 18 Exercise 19 Exercise 20    Exercise :                  Repetition/Time :                  Resist or Assist :                  Comment :                  Done :                                 Patient Education and Home Exercises       Education provided:    - Patient was instructed in a home exercise program as well as educated in spinal / pelvic anatomy and rationale for stabilization exercises.  She was also educated about supportive shoes for work.     Written Home Exercises Provided: Patient instructed to cont prior HEP. Exercises were reviewed and Danisha was able to demonstrate them prior to the end of the session.  Danisha demonstrated good  understanding of the education provided. See EMR under Patient Instructions for exercises provided during therapy sessions    Assessment     Patient tolerated exercises and gave good effort throughout.  She denies having the L-S pain now but did c/o quad soreness from newer exercises which lasted 2 days.  Exercises modified today to prevent severe soreness.    Danisha Is progressing well towards her goals.   Pt prognosis  is Excellent.     Pt will continue to benefit from skilled outpatient physical therapy to address the deficits listed in the problem list box on initial evaluation, provide pt/family education and to maximize pt's level of independence in the home and community environment.     Pt's spiritual, cultural and educational needs considered and pt agreeable to plan of care and goals.     Anticipated barriers to physical therapy:  back pain, SI dysfunction, muscle weakness     Goals: Short Term Goals (3 Weeks):  1. Patient will be compliant with home exercise program to supplement therapy in promoting functional mobility.--progressing  2. Patient will report pain </= 4/10 during lumbar active range of motion to promote functional mobility.--MET  3. Patient will improve bilateral hip girdle strength to >/= 4/5 for all muscle groups to improve strength for functional tasks.--progressing  4. 5x sit to stand will be 12 sec or less using UEs to assist as needed.--MET     Long Term Goals (6 Weeks):   1. Patient will improve FOTO score to >/= 66%  to decrease perceived limitation with maintaining/changing body position.   2. Patient will perform home exercise program with good control to demonstrate improved core strength.  3. Patient will improve impaired lower extremity manual muscle tests to >/= 4+/5 to improve strength for functional tasks.  4. Patient's pain complaints with be </= 2/10 during mobility.  5. MCTSIB will improve to 3/4 for safer community mobility.  6. Single leg stance will improve to >/= 15 sec bilaterally with eyes closed for reduced back pain during upright functioning.     Plan     Plan of care Certification: 8/15/2023 to 9/19/2023.     Outpatient Physical Therapy 2 times weekly for 4 weeks to include the following interventions: Manual Therapy, Neuromuscular Re-ed, Patient Education, and Therapeutic Exercise.     Shahida Lauren, PT

## 2023-09-12 ENCOUNTER — CLINICAL SUPPORT (OUTPATIENT)
Dept: REHABILITATION | Facility: HOSPITAL | Age: 33
End: 2023-09-12
Payer: MEDICAID

## 2023-09-12 DIAGNOSIS — M62.81 MUSCLE WEAKNESS OF LOWER EXTREMITY: ICD-10-CM

## 2023-09-12 DIAGNOSIS — M62.9 HAMSTRING TIGHTNESS OF BOTH LOWER EXTREMITIES: ICD-10-CM

## 2023-09-12 DIAGNOSIS — M53.3 SI (SACROILIAC) JOINT DYSFUNCTION: ICD-10-CM

## 2023-09-12 DIAGNOSIS — M54.9 BACK PAIN WITHOUT RADIATION: Primary | ICD-10-CM

## 2023-09-12 PROCEDURE — 97110 THERAPEUTIC EXERCISES: CPT

## 2023-09-12 NOTE — PLAN OF CARE
[facility]  Physical Therapy Plan of Care Note     Name: Danisha Rodriguez  Clinic Number: [id number]    Therapy Diagnosis:   Encounter Diagnoses   Name Primary?    Back pain without radiation Yes    Muscle weakness of lower extremity     SI (sacroiliac) joint dysfunction     Hamstring tightness of both lower extremities      Physician: Obdulia Bah NP    Visit Date: 9/12/2023    Physician Orders: PT Eval and Treat   Medical Diagnosis from Referral: M54.9 back pain without radiation  Evaluation Date: 8/15/2023  Authorization Period Expiration: 9/19/2023  Plan of Care Expiration: 9/19/2023  Progress Note completed: 9/12/2023  Visit # / Visits authorized: 5/10  FOTO: 2/3  (56; 48)    Precautions: Standard  Functional Level Prior to Evaluation:  independent    SUBJECTIVE     Update: Danisha continues to have back pain upon lumbar ROM and when standing up after sitting for a while.  Pain level is now as low as 0/10 but flares to 4/10 with activity.    OBJECTIVE     Update:   Lumbar ROM is now WNL but she still has pain with all motions at end range.    LE strength is now 5/5 with the exception of bilateral hip extension which is 4/5 R and 4-/5 L.    Balance: 5xSTS improved to 11.74 sec      Single leg stance: EO = 30 sec R/L; EC = R 7 sec, L 12 sec (both improved)      MCTSIB = 2/4 (30, 30, 15, 10) improved in position 3 and 4    Danisha received the treatments listed below:       therapeutic exercises to develop strength, flexibility, and core stabilization for 20 minutes including:  Therapeutic Exercise Grid     Exercise 1  Exercise 2  Exercise 3  Exercise 4    Exercise :    Muscle energy exercise bridging   Elevated dead lift  Sit to stands     Repetition/Time :    3    20 with ball spacer; 20 with blue TB   10 10     Resist or Assist :          18# KB   10#     Comment :    Now level             Done :    no yes   no  yes                      Exercise 5  Exercise 6  Exercise 7  Exercise  8    Exercise :    Bilateral 3 way hip   Bilateral clamshells   Piloff press   Farmer's carry   Repetition/Time :    10  20  10   2 laps each arm   Resist or Assist :     2# BLE except 0# hip ext   Blue TB  Blue TB     18# KB   Comment :                  Done :    yes   yes   no   yes                    Exercise 9  Exercise 10  Exercise 11  Exercise 12    Exercise :    Nu-step Dead bug       Repetition/Time :      5 min            Resist or Assist :      Level 6            Comment :      UE/LE            Done :      yes   no                           ASSESSMENT     Update: Danisha has improved in LE strength and previously noted SI dysfunction has resolved.  She continues to have core weakness impacting her pain with activity.  She has met the short term goals but not reached the long term goals.  Her FOTO score declined on the second survey indicating decreased perception of her functional mobility.    Previous Short Term Goals Status: (3 Weeks):  1. Patient will be compliant with home exercise program to supplement therapy in promoting functional mobility.--MET  2. Patient will report pain </= 4/10 during lumbar active range of motion to promote functional mobility.--MET  3. Patient will improve bilateral hip girdle strength to >/= 4/5 for all muscle groups to improve strength for functional tasks.--MET (except L hip extension =4-/5)  4. 5x sit to stand will be 12 sec or less using UEs to assist as needed--MET (11.74 sec)    New Short Term Goals Status:   work toward established LTG  Long Term Goal Status: continue per initial plan of care.  Reasons for Recertification of Therapy:   Patient has improved in LE strength, decreased pain at rest, and improved balance.  She will benefit from continued PT to meet LTG.      PLAN     Updated Certification Period: 9/21/2023 to 10/18/2023   Recommended Treatment Plan: 2 times per week for 4 weeks:  Neuromuscular Re-ed, Patient Education, and Therapeutic Exercise    Shahida  Fulton, PT    Physician's Signature: _________________________________________   Date: ________________

## 2023-09-14 ENCOUNTER — CLINICAL SUPPORT (OUTPATIENT)
Dept: REHABILITATION | Facility: HOSPITAL | Age: 33
End: 2023-09-14
Payer: MEDICAID

## 2023-09-14 DIAGNOSIS — M62.9 HAMSTRING TIGHTNESS OF BOTH LOWER EXTREMITIES: ICD-10-CM

## 2023-09-14 DIAGNOSIS — M54.9 BACK PAIN WITHOUT RADIATION: Primary | ICD-10-CM

## 2023-09-14 DIAGNOSIS — M62.81 MUSCLE WEAKNESS OF LOWER EXTREMITY: ICD-10-CM

## 2023-09-14 DIAGNOSIS — M53.3 SI (SACROILIAC) JOINT DYSFUNCTION: ICD-10-CM

## 2023-09-14 PROCEDURE — 97110 THERAPEUTIC EXERCISES: CPT

## 2023-09-14 NOTE — PROGRESS NOTES
[facility]   Physical Therapy Treatment Note      Name: Danisha Rodriguez  Clinic Number: [id number]    Therapy Diagnosis:   Encounter Diagnoses   Name Primary?    Back pain without radiation Yes    Muscle weakness of lower extremity     SI (sacroiliac) joint dysfunction     Hamstring tightness of both lower extremities      Physician: Obdulia Bah, NP     Physician Orders: PT Eval and Treat   Medical Diagnosis from Referral: M54.9 back pain without radiation  Evaluation Date: 8/15/2023  Authorization Period Expiration: 9/19/2023  Plan of Care Expiration: 9/19/2023  Progress Note Completed: 9/12/2023  Visit # / Visits authorized: 7/10  FOTO: 2/3 (56%; 48%)     Precautions: Standard      Time In: 0830   Time Out: 0857  Total Billable Time: 27 minutes  Visit Date: 9/14/2023    PTA Visit #: 0/5     Subjective     Pt reports: she is pain free more but still has pain with bending or standing after sitting  She was compliant with home exercise program.  Response to previous treatment: reduced pain  Functional change: improving mobility    Pain: 4/10 at worst, no meds  Location: quads    Objective      none    Treatment     Danisha received the treatments listed below:      therapeutic exercises to develop strength, flexibility, and core stabilization for 30 minutes including:  Therapeutic Exercise Grid     Exercise 1  Exercise 2  Exercise 3  Exercise 4    Exercise :    Muscle energy exercise Bridging: ball b/t knees and TB around knees  Elevated dead lift from lowest step Sit to stands     Repetition/Time :    3    20 each  10 20    Resist or Assist :        blue TB    18# kb 10#     Comment :    Level today           Done :    no yes   yes no                    Exercise 5  Exercise 6  Exercise 7  Exercise 8    Exercise :    Bilateral 3 way hip   Bilateral clamshells;   Piloff press QL stretch; PF stretch; hamstring stretch B     Repetition/Time :    2 x10 each; 1 x 10 ext  20 each   2 x 10  each direction 2 x 15 sec each     Resist or Assist :    2# Bilaterally; no wt for ext  Blue TB;   green TB      Comment :                  Done :    no yes yes yes                      Exercise 9  Exercise 10  Exercise 11  Exercise 12    Exercise :    Dead bug Hip hinge Nu-step    Repetition/Time :    20 2 x 5  5 minutes; UE/LE     Resist or Assist :        Level 6      Comment :      LE  marching only          Done :    yes yes no                     Exercise 13 Exercise 14  Exercise 15  Exercise 16   Exercise :    Baltazar's Carry   Piloff Press         Repetition/Time :    2 laps each arm   10           Resist or Assist :    18#   green           Comment :                  Done :    yes   yes                            Exercise 17 Exercise 18 Exercise 19 Exercise 20    Exercise :                  Repetition/Time :                  Resist or Assist :                  Comment :                  Done :                                 Patient Education and Home Exercises       Education provided:    - stretching exercises added today    Written Home Exercises Provided: Patient instructed to cont prior HEP. Exercises were reviewed and Danisha was able to demonstrate them prior to the end of the session.  Danisha demonstrated good  understanding of the education provided. See EMR under Patient Instructions for exercises provided during therapy sessions    Assessment     Patient tolerated exercises and gave good effort throughout.  Exercise program expanded for additional stabilization exercises as well as stretching which she could not tolerate before.  Educated in body mechanics for picking up toys from the floor. Recommended custom inserts for shoes.  Patient to call provider for script.    Danisha Is progressing well towards her goals.   Pt prognosis is Excellent.     Pt will continue to benefit from skilled outpatient physical therapy to address the deficits listed in the problem list box on initial evaluation, provide  pt/family education and to maximize pt's level of independence in the home and community environment.     Pt's spiritual, cultural and educational needs considered and pt agreeable to plan of care and goals.     Anticipated barriers to physical therapy:  back pain, SI dysfunction, muscle weakness     Goals: Short Term Goals (3 Weeks):  1. Patient will be compliant with home exercise program to supplement therapy in promoting functional mobility.--MET  2. Patient will report pain </= 4/10 during lumbar active range of motion to promote functional mobility.--MET  3. Patient will improve bilateral hip girdle strength to >/= 4/5 for all muscle groups to improve strength for functional tasks.--MET (except L hip extension = 4-/5)  4. 5x sit to stand will be 12 sec or less using UEs to assist as needed.--MET     Long Term Goals (6 Weeks):   1. Patient will improve FOTO score to >/= 66%  to decrease perceived limitation with maintaining/changing body position.   2. Patient will perform home exercise program with good control to demonstrate improved core strength.  3. Patient will improve impaired lower extremity manual muscle tests to >/= 4+/5 to improve strength for functional tasks.  4. Patient's pain complaints with be </= 2/10 during mobility.  5. MCTSIB will improve to 3/4 for safer community mobility.  6. Single leg stance will improve to >/= 15 sec bilaterally with eyes closed for reduced back pain during upright functioning.     Plan     Plan of care Certification: 8/15/2023 to 9/19/2023.     Outpatient Physical Therapy 2 times weekly for 4 weeks to include the following interventions: Manual Therapy, Neuromuscular Re-ed, Patient Education, and Therapeutic Exercise.     Shahida Lauren, PT

## 2023-09-19 ENCOUNTER — CLINICAL SUPPORT (OUTPATIENT)
Dept: REHABILITATION | Facility: HOSPITAL | Age: 33
End: 2023-09-19
Payer: MEDICAID

## 2023-09-19 DIAGNOSIS — M62.9 HAMSTRING TIGHTNESS OF BOTH LOWER EXTREMITIES: ICD-10-CM

## 2023-09-19 DIAGNOSIS — M54.9 BACK PAIN WITHOUT RADIATION: Primary | ICD-10-CM

## 2023-09-19 DIAGNOSIS — M62.81 MUSCLE WEAKNESS OF LOWER EXTREMITY: ICD-10-CM

## 2023-09-19 DIAGNOSIS — M53.3 SI (SACROILIAC) JOINT DYSFUNCTION: ICD-10-CM

## 2023-09-19 PROCEDURE — 97110 THERAPEUTIC EXERCISES: CPT

## 2023-09-19 NOTE — PROGRESS NOTES
[facility]   Physical Therapy Treatment Note      Name: Danisha Rodriguez  Clinic Number: [id number]    Therapy Diagnosis:   Encounter Diagnoses   Name Primary?    Back pain without radiation Yes    Muscle weakness of lower extremity     SI (sacroiliac) joint dysfunction     Hamstring tightness of both lower extremities      Physician: Obdulia Bah, NP     Physician Orders: PT Eval and Treat   Medical Diagnosis from Referral: M54.9 back pain without radiation  Evaluation Date: 8/15/2023  Authorization Period Expiration: 9/19/2023  Plan of Care Expiration: 9/19/2023  Progress Note Completed: 9/12/2023  Visit # / Visits authorized: 7/10  FOTO: 3/3 (56%; 48%; 54%)     Precautions: Standard      Time In: 0830   Time Out: 0858  Total Billable Time: 28 minutes  Visit Date: 9/19/2023    PTA Visit #: 0/5     Subjective     Pt reports: she is pain free more but still has pain with activity although the intensity of the pain is less.  She was compliant with home exercise program.  Response to previous treatment: reduced pain  Functional change: improving mobility    Pain: 4/10 at worst, no meds  Location: back    Objective      FOTO repeated with score improved from last survey to 54%.    Treatment     Danisha received the treatments listed below:      therapeutic exercises to develop strength, flexibility, and core stabilization for 30 minutes including:  Therapeutic Exercise Grid     Exercise 1  Exercise 2  Exercise 3  Exercise 4    Exercise :    Muscle energy exercise Bridging: ball b/t knees and TB around knees  Elevated dead lift from lowest step Sit to stands     Repetition/Time :    3    20 each  10 10    Resist or Assist :        blue TB    18# kb 18#     Comment :    Level today           Done :    no yes   yes yes                    Exercise 5  Exercise 6  Exercise 7  Exercise 8    Exercise :    Bilateral 3 way hip   Bilateral clamshells;   Piloff press QL stretch; PF stretch; hamstring  stretch B     Repetition/Time :    2 x10 each; 1 x 10 ext  20 each   10 each direction 2 x 15 sec each     Resist or Assist :    2# Bilaterally; no wt for ext  Blue TB;  Blue TB      Comment :                  Done :    no yes yes yes                      Exercise 9  Exercise 10  Exercise 11  Exercise 12    Exercise :    Dead bug Hip hinge Nu-step    Repetition/Time :    10-LE only; 10-UE/LE  10  5 minutes;    Resist or Assist :        Level 6      Comment :      LE only today        Done :    yes yes no                     Exercise 13 Exercise 14  Exercise 15  Exercise 16   Exercise :    Farmer's Carry            Repetition/Time :    1 laps each arm            Resist or Assist :    26#            Comment :                  Done :    yes                             Exercise 17 Exercise 18 Exercise 19 Exercise 20    Exercise :                  Repetition/Time :                  Resist or Assist :                  Comment :                  Done :                                 Patient Education and Home Exercises       Education provided:    - stretching exercises added today    Written Home Exercises Provided: Patient instructed to cont prior HEP. Exercises were reviewed and Danisha was able to demonstrate them prior to the end of the session.  Danisha demonstrated good  understanding of the education provided. See EMR under Patient Instructions for exercises provided during therapy sessions    Assessment     Patient tolerated exercises and gave good effort throughout.  Increased resistance added with reps decreased to accommodate.  No complaints of pain.  She continues to improve as noted by less pain with activity.  Patient encouraged to continue her home exercise program independently until additional authorization is received.    Danisha Is progressing well towards her goals.   Pt prognosis is Excellent.     Pt will continue to benefit from skilled outpatient physical therapy to address the deficits listed in the  problem list box on initial evaluation, provide pt/family education and to maximize pt's level of independence in the home and community environment.     Pt's spiritual, cultural and educational needs considered and pt agreeable to plan of care and goals.     Anticipated barriers to physical therapy:  back pain, SI dysfunction, muscle weakness     Goals: Short Term Goals (3 Weeks):  1. Patient will be compliant with home exercise program to supplement therapy in promoting functional mobility.--MET  2. Patient will report pain </= 4/10 during lumbar active range of motion to promote functional mobility.--MET  3. Patient will improve bilateral hip girdle strength to >/= 4/5 for all muscle groups to improve strength for functional tasks.--MET (except L hip extension = 4-/5)  4. 5x sit to stand will be 12 sec or less using UEs to assist as needed.--MET     Long Term Goals (6 Weeks):   1. Patient will improve FOTO score to >/= 66%  to decrease perceived limitation with maintaining/changing body position. --progressing  2. Patient will perform home exercise program with good control to demonstrate improved core strength.  3. Patient will improve impaired lower extremity manual muscle tests to >/= 4+/5 to improve strength for functional tasks.  4. Patient's pain complaints with be </= 2/10 during mobility.  5. MCTSIB will improve to 3/4 for safer community mobility.  6. Single leg stance will improve to >/= 15 sec bilaterally with eyes closed for reduced back pain during upright functioning.     Plan     Plan of care Certification: 8/15/2023 to 9/19/2023.     Outpatient Physical Therapy 2 times weekly for 4 weeks to include the following interventions: Manual Therapy, Neuromuscular Re-ed, Patient Education, and Therapeutic Exercise.     Shahida Lauren, PT

## 2023-09-27 DIAGNOSIS — M54.9 BACK PAIN WITHOUT RADIATION: Primary | ICD-10-CM

## 2023-10-02 ENCOUNTER — CLINICAL SUPPORT (OUTPATIENT)
Dept: REHABILITATION | Facility: HOSPITAL | Age: 33
End: 2023-10-02
Payer: MEDICAID

## 2023-10-02 DIAGNOSIS — M62.9 HAMSTRING TIGHTNESS OF BOTH LOWER EXTREMITIES: ICD-10-CM

## 2023-10-02 DIAGNOSIS — M62.81 MUSCLE WEAKNESS OF LOWER EXTREMITY: ICD-10-CM

## 2023-10-02 DIAGNOSIS — M53.3 SI (SACROILIAC) JOINT DYSFUNCTION: ICD-10-CM

## 2023-10-02 DIAGNOSIS — M54.9 BACK PAIN WITHOUT RADIATION: Primary | ICD-10-CM

## 2023-10-02 PROCEDURE — 97110 THERAPEUTIC EXERCISES: CPT

## 2023-10-02 NOTE — PROGRESS NOTES
[facility]   Physical Therapy Treatment Note      Name: Danisha Rodriguez  Clinic Number: [id number]    Therapy Diagnosis:   Encounter Diagnoses   Name Primary?    Back pain without radiation Yes    Muscle weakness of lower extremity     SI (sacroiliac) joint dysfunction     Hamstring tightness of both lower extremities      Physician: Obdulia Bah, NP     Physician Orders: PT Eval and Treat   Medical Diagnosis from Referral: M54.9 back pain without radiation  Evaluation Date: 8/15/2023  Authorization Period Expiration: 12/13/2023  Plan of Care Expiration: 12/13/2023  Progress Note Completed: 9/12/2023  Visit # / Visits authorized: 1/2  FOTO: 56%; 48%; 54%; 54%     Precautions: Standard      Time In: 0830   Time Out: 0900  Total Billable Time: 30 minutes  Visit Date: 10/2/2023    PTA Visit #: 0/5     Subjective     Pt reports: pain is worse today, 6/10.  She traveled to [place] and had pain from sleeping on a different mattress.  She was somewhat compliant with home exercise program since her last visit.  Response to previous treatment: she was having reduced pain  Functional change: improving mobility    Pain: 6/10 at worst, no meds  Location: back    Objective      FOTO repeated with score improved from last survey to 54%.    Treatment     Danisha received the treatments listed below:      therapeutic exercises to develop strength, flexibility, and core stabilization for 30 minutes including:  Therapeutic Exercise Grid     Exercise 1  Exercise 2  Exercise 3  Exercise 4    Exercise :    Muscle energy exercise Bridging: ball b/t knees and TB around knees  Elevated dead lift from lowest step Sit to stands     Repetition/Time :    3    20 each  10 10    Resist or Assist :       5#; blue TB    25# kb 18#     Comment :    Level today           Done :    no yes   yes yes                    Exercise 5  Exercise 6  Exercise 7  Exercise 8    Exercise :    Bilateral 3 way hip   Bilateral  clamshells;   Piloff press QL stretch;  hamstring stretch B     Repetition/Time :    2 x10 each; 1 x 10 ext  20 each   2 x 10 each direction 2 x 15 sec each     Resist or Assist :    2# Bilaterally; no wt for ext  black TB;  Blue TB      Comment :                  Done :    no yes yes yes                      Exercise 9  Exercise 10  Exercise 11  Exercise 12    Exercise :    Dead bug Hip hinge Nu-step    Repetition/Time :    2 x 10 UE/LE  10  5 minutes;    Resist or Assist :        Level 6      Comment :      LE only today        Done :    yes yes no                     Exercise 13 Exercise 14  Exercise 15  Exercise 16   Exercise :    Farmer's Carry            Repetition/Time :    1 laps each arm            Resist or Assist :    26#            Comment :                  Done :    yes                             Exercise 17 Exercise 18 Exercise 19 Exercise 20    Exercise :                  Repetition/Time :                  Resist or Assist :                  Comment :                  Done :                                 Patient Education and Home Exercises       Education provided:    - stretching exercises added today    Written Home Exercises Provided: Patient instructed to cont prior HEP. Exercises were reviewed and Danisha was able to demonstrate them prior to the end of the session.  Danisha demonstrated good  understanding of the education provided. See EMR under Patient Instructions for exercises provided during therapy sessions    Assessment     Patient complained of exacerbated back pain at midline L-S junction. She admitted to not doing exercises as much, especially over the weekend because they went out of town (about a 3 hour trip).  Between the travel, sleeping in a different bed, and sitting around more, her pain exacerbated.  Patient was educated that discomfort sometimes comes with these situations but she needs to resume the exercise program to relieve the pain.  Patient was only approved for 2 more  visit.  Therapist will reassess next visit to see if pain is resolving.    Danisha Is progressing well towards her goals.   Pt prognosis is Excellent.     Pt will continue to benefit from skilled outpatient physical therapy to address the deficits listed in the problem list box on initial evaluation, provide pt/family education and to maximize pt's level of independence in the home and community environment.     Pt's spiritual, cultural and educational needs considered and pt agreeable to plan of care and goals.     Anticipated barriers to physical therapy:  back pain, SI dysfunction, muscle weakness     Goals: Short Term Goals (3 Weeks):  1. Patient will be compliant with home exercise program to supplement therapy in promoting functional mobility.--MET  2. Patient will report pain </= 4/10 during lumbar active range of motion to promote functional mobility.--MET  3. Patient will improve bilateral hip girdle strength to >/= 4/5 for all muscle groups to improve strength for functional tasks.--MET (except L hip extension = 4-/5)  4. 5x sit to stand will be 12 sec or less using UEs to assist as needed.--MET     Long Term Goals (6 Weeks):   1. Patient will improve FOTO score to >/= 66%  to decrease perceived limitation with maintaining/changing body position. --progressing  2. Patient will perform home exercise program with good control to demonstrate improved core strength.  3. Patient will improve impaired lower extremity manual muscle tests to >/= 4+/5 to improve strength for functional tasks.  4. Patient's pain complaints with be </= 2/10 during mobility.  5. MCTSIB will improve to 3/4 for safer community mobility.  6. Single leg stance will improve to >/= 15 sec bilaterally with eyes closed for reduced back pain during upright functioning.     Plan     Plan of care Certification: 9/27/23 to 12/13/23.     Outpatient Physical Therapy for 2 additional visits to include the following interventions: Manual Therapy,  Neuromuscular Re-ed, Patient Education, and Therapeutic Exercise.     Shahida Lauren, PT

## 2023-10-10 ENCOUNTER — CLINICAL SUPPORT (OUTPATIENT)
Dept: REHABILITATION | Facility: HOSPITAL | Age: 33
End: 2023-10-10
Payer: MEDICAID

## 2023-10-10 DIAGNOSIS — M53.3 SI (SACROILIAC) JOINT DYSFUNCTION: ICD-10-CM

## 2023-10-10 DIAGNOSIS — M54.9 BACK PAIN WITHOUT RADIATION: Primary | ICD-10-CM

## 2023-10-10 DIAGNOSIS — M62.9 HAMSTRING TIGHTNESS OF BOTH LOWER EXTREMITIES: ICD-10-CM

## 2023-10-10 DIAGNOSIS — M62.81 MUSCLE WEAKNESS OF LOWER EXTREMITY: ICD-10-CM

## 2023-10-10 PROCEDURE — 97110 THERAPEUTIC EXERCISES: CPT

## 2023-10-10 NOTE — PLAN OF CARE
[facility]  Physical Therapy Discharge Note    Name: Danisha Rodriguez  Clinic Number: [id number]    Therapy Diagnosis:   Encounter Diagnoses   Name Primary?    Back pain without radiation Yes    Muscle weakness of lower extremity     SI (sacroiliac) joint dysfunction     Hamstring tightness of both lower extremities      Physician: Obdulia Bah, NP    Physician Orders: PT Eval and Treat   Medical Diagnosis from Referral: M54.9 back pain without radiation  Evaluation Date: 8/15/2023  Authorization Period Expiration: 12/13/2023  Plan of Care Expiration: 12/13/2023  Progress Note Completed: 9/12/2023  Visit # / Visits authorized: 2/2  FOTO: 56%; 48%; 54%; 54%    Date of Last visit: 10/10/2023  Total Visits Received: 9/12    ASSESSMENT      Danisha has made good progress in her strength and her pain is less in general but still present (0/10 at rest and up to 4/10 with activity).  Her leg strength is now 5/5 and balance is WNL in general.  Initially she had SI dysfunction which has now resolved.  She continues to complain of midline L4 back pain with bending over or when sitting on a soft or low seat.  She was educated for improved posture and positioning.      therapeutic exercises to develop strength, flexibility, and core stabilization for 30 minutes including:  Therapeutic Exercise Grid     Exercise 1  Exercise 2  Exercise 3  Exercise 4    Exercise :    Muscle energy exercise Bridging: ball b/t knees and TB around knees  Elevated dead lift from lowest step Sit to stands     Repetition/Time :    3    20 each  10 10    Resist or Assist :       5#; black TB    25# kb 18#     Comment :    Level today            Done :    no yes   yes no                    Exercise 5  Exercise 6  Exercise 7  Exercise 8    Exercise :    Bilateral 3 way hip   Bilateral clamshells;   Piloff press   hamstring stretch B     Repetition/Time :    2 x10 each; 1 x 10 ext  20 each   2 x 10 each direction 2 x 15 sec  each     Resist or Assist :    2# Bilaterally; no wt for ext  black TB;  Black TB      Comment :                  Done :    no yes yes yes                      Exercise 9  Exercise 10  Exercise 11  Exercise 12    Exercise :    Dead bug Hip hinge Nu-step     Repetition/Time :    20 UE/LE  10  5 minutes;     Resist or Assist :         Level 6       Comment :        LE only today        Done :    yes yes no                      Exercise 13 Exercise 14  Exercise 15  Exercise 16   Exercise :    Farmer's Carry             Repetition/Time :    1 laps each arm             Resist or Assist :    26#             Comment :                  Done :    yes                                Discharge reason: Patient has reached the maximum rehab potential for the present time and Patient has completed allowable visits authorized by insurance    Discharge FOTO Score: 54%    Goals:   Goals: Short Term Goals (3 Weeks):  1. Patient will be compliant with home exercise program to supplement therapy in promoting functional mobility.--MET  2. Patient will report pain </= 4/10 during lumbar active range of motion to promote functional mobility.--MET  3. Patient will improve bilateral hip girdle strength to >/= 4/5 for all muscle groups to improve strength for functional tasks.--MET (except L hip extension = 4-/5)  4. 5x sit to stand will be 12 sec or less using UEs to assist as needed.--MET     Long Term Goals (6 Weeks):   1. Patient will improve FOTO score to >/= 66%  to decrease perceived limitation with maintaining/changing body position. --NOT MET (varies according to pain level)  2. Patient will perform home exercise program with good control to demonstrate improved core strength.--MET  3. Patient will improve impaired lower extremity manual muscle tests to >/= 4+/5 to improve strength for functional tasks.--MET  4. Patient's pain complaints with be </= 2/10 during mobility.--NOT MET  5. MCTSIB will improve to 3/4 for safer community  mobility.--NOT MET  6. Single leg stance will improve to >/= 15 sec bilaterally with eyes closed for reduced back pain during upright functioning. --NOT MET    PLAN   This patient is discharged from Physical Therapy      Shahida Lauren PT